# Patient Record
Sex: FEMALE | NOT HISPANIC OR LATINO | URBAN - METROPOLITAN AREA
[De-identification: names, ages, dates, MRNs, and addresses within clinical notes are randomized per-mention and may not be internally consistent; named-entity substitution may affect disease eponyms.]

---

## 2017-12-05 ENCOUNTER — APPOINTMENT (RX ONLY)
Dept: URBAN - METROPOLITAN AREA CLINIC 130 | Facility: CLINIC | Age: 52
Setting detail: DERMATOLOGY
End: 2017-12-05

## 2017-12-05 DIAGNOSIS — D22 MELANOCYTIC NEVI: ICD-10-CM

## 2017-12-05 DIAGNOSIS — L57.0 ACTINIC KERATOSIS: ICD-10-CM

## 2017-12-05 PROBLEM — F32.9 MAJOR DEPRESSIVE DISORDER, SINGLE EPISODE, UNSPECIFIED: Status: ACTIVE | Noted: 2017-12-05

## 2017-12-05 PROBLEM — D22.5 MELANOCYTIC NEVI OF TRUNK: Status: ACTIVE | Noted: 2017-12-05

## 2017-12-05 PROBLEM — D22.72 MELANOCYTIC NEVI OF LEFT LOWER LIMB, INCLUDING HIP: Status: ACTIVE | Noted: 2017-12-05

## 2017-12-05 PROBLEM — D22.71 MELANOCYTIC NEVI OF RIGHT LOWER LIMB, INCLUDING HIP: Status: ACTIVE | Noted: 2017-12-05

## 2017-12-05 PROCEDURE — ? COUNSELING

## 2017-12-05 PROCEDURE — 99214 OFFICE O/P EST MOD 30 MIN: CPT | Mod: 25

## 2017-12-05 PROCEDURE — ? OBSERVATION AND MEASURE

## 2017-12-05 PROCEDURE — 17000 DESTRUCT PREMALG LESION: CPT

## 2017-12-05 PROCEDURE — ? LIQUID NITROGEN

## 2017-12-05 ASSESSMENT — LOCATION DETAILED DESCRIPTION DERM
LOCATION DETAILED: RIGHT BUTTOCK
LOCATION DETAILED: LEFT INFERIOR FOREHEAD
LOCATION DETAILED: LEFT MID-UPPER BACK
LOCATION DETAILED: RIGHT PROXIMAL CALF
LOCATION DETAILED: LEFT DISTAL POSTERIOR THIGH
LOCATION DETAILED: LEFT MEDIAL PLANTAR MIDFOOT

## 2017-12-05 ASSESSMENT — LOCATION SIMPLE DESCRIPTION DERM
LOCATION SIMPLE: RIGHT CALF
LOCATION SIMPLE: LEFT FOREHEAD
LOCATION SIMPLE: RIGHT BUTTOCK
LOCATION SIMPLE: LEFT POSTERIOR THIGH
LOCATION SIMPLE: LEFT UPPER BACK
LOCATION SIMPLE: LEFT PLANTAR SURFACE

## 2017-12-05 ASSESSMENT — LOCATION ZONE DERM
LOCATION ZONE: FACE
LOCATION ZONE: TRUNK
LOCATION ZONE: FEET
LOCATION ZONE: LEG

## 2017-12-05 NOTE — PROCEDURE: OBSERVATION
Morphology Per Location (Optional): bm
Size Of Lesion: 4mm
Size Of Lesion: 3mm
Detail Level: Simple
Size Of Lesion: 4.5 mm
Detail Level: Detailed
Size Of Lesion: 6x 3.5mm
Morphology Per Location (Optional): TM

## 2018-06-28 ENCOUNTER — APPOINTMENT (RX ONLY)
Dept: URBAN - METROPOLITAN AREA CLINIC 128 | Facility: CLINIC | Age: 53
Setting detail: DERMATOLOGY
End: 2018-06-28

## 2018-06-28 DIAGNOSIS — L71.8 OTHER ROSACEA: ICD-10-CM

## 2018-06-28 DIAGNOSIS — L98.8 OTHER SPECIFIED DISORDERS OF THE SKIN AND SUBCUTANEOUS TISSUE: ICD-10-CM

## 2018-06-28 DIAGNOSIS — L81.4 OTHER MELANIN HYPERPIGMENTATION: ICD-10-CM

## 2018-06-28 PROCEDURE — ? OTHER

## 2018-06-28 PROCEDURE — 99213 OFFICE O/P EST LOW 20 MIN: CPT

## 2018-06-28 PROCEDURE — ? COUNSELING

## 2018-06-28 PROCEDURE — ? TREATMENT REGIMEN

## 2018-06-28 ASSESSMENT — LOCATION SIMPLE DESCRIPTION DERM
LOCATION SIMPLE: LEFT CHEEK
LOCATION SIMPLE: INFERIOR FOREHEAD
LOCATION SIMPLE: GLABELLA
LOCATION SIMPLE: RIGHT CHEEK

## 2018-06-28 ASSESSMENT — LOCATION DETAILED DESCRIPTION DERM
LOCATION DETAILED: GLABELLA
LOCATION DETAILED: INFERIOR MID FOREHEAD
LOCATION DETAILED: LEFT CENTRAL MALAR CHEEK
LOCATION DETAILED: RIGHT INFERIOR CENTRAL MALAR CHEEK

## 2018-06-28 ASSESSMENT — LOCATION ZONE DERM: LOCATION ZONE: FACE

## 2018-06-28 NOTE — PROCEDURE: TREATMENT REGIMEN
Plan: Discussed Belotero 1 syringe for glabella and possibly corners of mouth
Detail Level: Zone
Continue Regimen: Daily sunscreen
Initiate Treatment: Cerave cream/Cetaphil cream/Vanicream

## 2018-06-28 NOTE — HPI: SECONDARY COMPLAINT
How Severe Is This Condition?: mild
Additional History: Pt has noticed itching between her brows and in general, dryness and \"menopause changes\" to her face.\\n She has tried different OTC moisturizers with no real significant improvement.

## 2018-06-28 NOTE — PROCEDURE: OTHER
Detail Level: Simple
Other (Free Text): LN2 DSF
Note Text (......Xxx Chief Complaint.): This diagnosis correlates with the

## 2020-02-10 ENCOUNTER — APPOINTMENT (RX ONLY)
Dept: URBAN - METROPOLITAN AREA CLINIC 118 | Facility: CLINIC | Age: 55
Setting detail: DERMATOLOGY
End: 2020-02-10

## 2020-02-10 DIAGNOSIS — L23.9 ALLERGIC CONTACT DERMATITIS, UNSPECIFIED CAUSE: ICD-10-CM

## 2020-02-10 PROBLEM — F41.9 ANXIETY DISORDER, UNSPECIFIED: Status: ACTIVE | Noted: 2020-02-10

## 2020-02-10 PROBLEM — F32.9 MAJOR DEPRESSIVE DISORDER, SINGLE EPISODE, UNSPECIFIED: Status: ACTIVE | Noted: 2020-02-10

## 2020-02-10 PROBLEM — I10 ESSENTIAL (PRIMARY) HYPERTENSION: Status: ACTIVE | Noted: 2020-02-10

## 2020-02-10 PROCEDURE — 99213 OFFICE O/P EST LOW 20 MIN: CPT

## 2020-02-10 PROCEDURE — ? PRESCRIPTION

## 2020-02-10 PROCEDURE — ? TREATMENT REGIMEN

## 2020-02-10 RX ORDER — TRIAMCINOLONE ACETONIDE 0.25 MG/G
OINTMENT TOPICAL
Qty: 1 | Refills: 1 | Status: ERX | COMMUNITY
Start: 2020-02-10

## 2020-02-10 RX ADMIN — TRIAMCINOLONE ACETONIDE: 0.25 OINTMENT TOPICAL at 00:00

## 2020-02-10 ASSESSMENT — LOCATION SIMPLE DESCRIPTION DERM
LOCATION SIMPLE: RIGHT SUPERIOR EYELID
LOCATION SIMPLE: RIGHT CHEEK
LOCATION SIMPLE: LEFT SUPERIOR EYELID
LOCATION SIMPLE: LEFT CHEEK

## 2020-02-10 ASSESSMENT — LOCATION ZONE DERM
LOCATION ZONE: EYELID
LOCATION ZONE: FACE

## 2020-02-10 ASSESSMENT — LOCATION DETAILED DESCRIPTION DERM
LOCATION DETAILED: LEFT SUPERIOR CENTRAL MALAR CHEEK
LOCATION DETAILED: RIGHT CENTRAL MALAR CHEEK
LOCATION DETAILED: RIGHT LATERAL SUPERIOR EYELID
LOCATION DETAILED: LEFT LATERAL SUPERIOR EYELID

## 2020-02-10 NOTE — HPI: RASH
What Type Of Note Output Would You Prefer (Optional)?: Bullet Format
Is This A New Presentation, Or A Follow-Up?: Rash
Additional History: Had same reaction about a month ago and used otc hc cream for a week and helped

## 2020-03-05 ENCOUNTER — APPOINTMENT (RX ONLY)
Dept: URBAN - METROPOLITAN AREA CLINIC 118 | Facility: CLINIC | Age: 55
Setting detail: DERMATOLOGY
End: 2020-03-05

## 2020-03-05 DIAGNOSIS — L23.9 ALLERGIC CONTACT DERMATITIS, UNSPECIFIED CAUSE: ICD-10-CM

## 2020-03-05 DIAGNOSIS — L71.8 OTHER ROSACEA: ICD-10-CM

## 2020-03-05 DIAGNOSIS — L81.4 OTHER MELANIN HYPERPIGMENTATION: ICD-10-CM

## 2020-03-05 PROBLEM — F41.9 ANXIETY DISORDER, UNSPECIFIED: Status: ACTIVE | Noted: 2020-03-05

## 2020-03-05 PROBLEM — I10 ESSENTIAL (PRIMARY) HYPERTENSION: Status: ACTIVE | Noted: 2020-03-05

## 2020-03-05 PROCEDURE — ? PRESCRIPTION

## 2020-03-05 PROCEDURE — 99213 OFFICE O/P EST LOW 20 MIN: CPT

## 2020-03-05 PROCEDURE — ? TREATMENT REGIMEN

## 2020-03-05 RX ORDER — IVERMECTIN 10 MG/G
CREAM TOPICAL
Qty: 1 | Refills: 3 | Status: ERX | COMMUNITY
Start: 2020-03-05

## 2020-03-05 RX ADMIN — IVERMECTIN: 10 CREAM TOPICAL at 00:00

## 2020-03-05 ASSESSMENT — LOCATION SIMPLE DESCRIPTION DERM
LOCATION SIMPLE: LEFT CHEEK
LOCATION SIMPLE: LEFT SUPERIOR EYELID
LOCATION SIMPLE: RIGHT SUPERIOR EYELID

## 2020-03-05 ASSESSMENT — LOCATION ZONE DERM
LOCATION ZONE: FACE
LOCATION ZONE: EYELID

## 2020-03-05 ASSESSMENT — LOCATION DETAILED DESCRIPTION DERM
LOCATION DETAILED: LEFT MEDIAL SUPERIOR EYELID
LOCATION DETAILED: LEFT INFERIOR CENTRAL MALAR CHEEK
LOCATION DETAILED: RIGHT MEDIAL SUPERIOR EYELID

## 2020-03-05 NOTE — HPI: SECONDARY COMPLAINT
How Severe Is This Condition?: mild
Additional History: Lesion on the left upper lip area that she would like looked at. No other concerns.

## 2020-03-05 NOTE — PROCEDURE: TREATMENT REGIMEN
Detail Level: Zone
Initiate Treatment: Soolantra qd
Plan: Precert patch test
Initiate Treatment: Hydroquinone 4% bid written Rx
Detail Level: Simple

## 2021-02-12 ENCOUNTER — APPOINTMENT (RX ONLY)
Dept: URBAN - METROPOLITAN AREA CLINIC 120 | Facility: CLINIC | Age: 56
Setting detail: DERMATOLOGY
End: 2021-02-12

## 2021-02-12 DIAGNOSIS — D22 MELANOCYTIC NEVI: ICD-10-CM

## 2021-02-12 DIAGNOSIS — L81.4 OTHER MELANIN HYPERPIGMENTATION: ICD-10-CM

## 2021-02-12 DIAGNOSIS — L71.8 OTHER ROSACEA: ICD-10-CM

## 2021-02-12 PROBLEM — D22.72 MELANOCYTIC NEVI OF LEFT LOWER LIMB, INCLUDING HIP: Status: ACTIVE | Noted: 2021-02-12

## 2021-02-12 PROBLEM — D22.71 MELANOCYTIC NEVI OF RIGHT LOWER LIMB, INCLUDING HIP: Status: ACTIVE | Noted: 2021-02-12

## 2021-02-12 PROBLEM — D22.5 MELANOCYTIC NEVI OF TRUNK: Status: ACTIVE | Noted: 2021-02-12

## 2021-02-12 PROCEDURE — ? COUNSELING

## 2021-02-12 PROCEDURE — ? OBSERVATION AND MEASURE

## 2021-02-12 PROCEDURE — ? TREATMENT REGIMEN

## 2021-02-12 PROCEDURE — 99213 OFFICE O/P EST LOW 20 MIN: CPT

## 2021-02-12 ASSESSMENT — LOCATION DETAILED DESCRIPTION DERM
LOCATION DETAILED: RIGHT MEDIAL BREAST 5-6:00 REGION
LOCATION DETAILED: RIGHT ANTERIOR PROXIMAL THIGH
LOCATION DETAILED: LEFT ANTERIOR LATERAL PROXIMAL THIGH
LOCATION DETAILED: RIGHT INFERIOR UPPER BACK
LOCATION DETAILED: LEFT INFERIOR CENTRAL MALAR CHEEK
LOCATION DETAILED: LEFT DISTAL POSTERIOR THIGH
LOCATION DETAILED: LEFT INFERIOR LATERAL UPPER BACK
LOCATION DETAILED: LEFT INFERIOR UPPER BACK
LOCATION DETAILED: RIGHT INFERIOR CENTRAL MALAR CHEEK
LOCATION DETAILED: LEFT MEDIAL PLANTAR MIDFOOT

## 2021-02-12 ASSESSMENT — LOCATION ZONE DERM
LOCATION ZONE: TRUNK
LOCATION ZONE: LEG
LOCATION ZONE: FACE
LOCATION ZONE: FEET

## 2021-02-12 ASSESSMENT — LOCATION SIMPLE DESCRIPTION DERM
LOCATION SIMPLE: LEFT THIGH
LOCATION SIMPLE: LEFT POSTERIOR THIGH
LOCATION SIMPLE: RIGHT BREAST
LOCATION SIMPLE: RIGHT THIGH
LOCATION SIMPLE: LEFT CHEEK
LOCATION SIMPLE: LEFT UPPER BACK
LOCATION SIMPLE: LEFT PLANTAR SURFACE
LOCATION SIMPLE: RIGHT CHEEK
LOCATION SIMPLE: RIGHT UPPER BACK

## 2021-02-12 ASSESSMENT — SEVERITY ASSESSMENT OVERALL AMONG ALL PATIENTS
IN YOUR EXPERIENCE, AMONG ALL PATIENTS YOU HAVE SEEN WITH THIS CONDITION, HOW SEVERE IS THIS PATIENT'S CONDITION?: MINIMAL

## 2021-02-12 NOTE — PROCEDURE: OBSERVATION
Detail Level: Simple
Size Of Lesion: 4mm
Morphology Per Location (Optional): BM
Size Of Lesion: 3mm
Size Of Lesion: 4x3mm
Size Of Lesion: 2mm
Size Of Lesion: 6x4mm
Morphology Per Location (Optional): TM
Size Of Lesion: 6x2mm